# Patient Record
Sex: FEMALE | Race: WHITE | URBAN - METROPOLITAN AREA
[De-identification: names, ages, dates, MRNs, and addresses within clinical notes are randomized per-mention and may not be internally consistent; named-entity substitution may affect disease eponyms.]

---

## 2022-01-26 ENCOUNTER — OFFICE VISIT (OUTPATIENT)
Dept: URBAN - METROPOLITAN AREA CLINIC 30 | Facility: CLINIC | Age: 52
End: 2022-01-26
Payer: COMMERCIAL

## 2022-01-26 DIAGNOSIS — H16.223 KERATOCONJUNCTIVITIS SICCA, BILATERAL: Primary | ICD-10-CM

## 2022-01-26 DIAGNOSIS — H00.021 HORDEOLUM INTERNUM RIGHT UPPER LID: ICD-10-CM

## 2022-01-26 PROCEDURE — 0330T TEAR FILM IMG UNI/BI W/I&R: CPT | Performed by: OPTOMETRIST

## 2022-01-26 PROCEDURE — 68761 CLOSE TEAR DUCT OPENING: CPT | Performed by: OPTOMETRIST

## 2022-01-26 RX ORDER — CYCLOSPORINE 0 G/ML
0.09 % SOLUTION/ DROPS OPHTHALMIC; TOPICAL
Qty: 180 | Refills: 3 | Status: ACTIVE
Start: 2022-01-26

## 2022-01-26 RX ORDER — CYCLOSPORINE 0.5 MG/ML
0.05 % EMULSION OPHTHALMIC
Qty: 180 | Refills: 6 | Status: ACTIVE
Start: 2022-01-26

## 2022-01-26 NOTE — IMPRESSION/PLAN
Impression: Keratoconjunctivitis sicca, bilateral: N84.585.
+sjrogrens +dry mouth +fatigue +RA suspect +Asthma
01/2022 [place] 10, 5 Mild [place] Plan: Pt  notes redness, headaches, crust OU. +allergies. +computers daily. Usng Claritin. Cont AT's qid OU. Recommend O3's. Avoid ceiling fans. 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 +blinking. Recently diagnosed w sjrogrens x 2 weeks ago by PCP. Pt see's rheumatologist  x 3 weeks. Start Cequa BID OU. 

01/2022 BLL 0.6mm Ultra Plugs DEC today 01/2022. Pt ed of condition. Discussed in detail ADDE/JOHNNY. 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 Discussed options for treatment such as IPL and Ilux Combo in order to maintain MG function. Pt aware of out of pocket cost $350.

## 2022-02-28 ENCOUNTER — OFFICE VISIT (OUTPATIENT)
Dept: URBAN - METROPOLITAN AREA CLINIC 30 | Facility: CLINIC | Age: 52
End: 2022-02-28
Payer: COMMERCIAL

## 2022-02-28 DIAGNOSIS — H43.393 OTHER VITREOUS OPACITIES, BILATERAL: ICD-10-CM

## 2022-02-28 PROCEDURE — 92134 CPTRZ OPH DX IMG PST SGM RTA: CPT | Performed by: OPTOMETRIST

## 2022-02-28 PROCEDURE — 92014 COMPRE OPH EXAM EST PT 1/>: CPT | Performed by: OPTOMETRIST

## 2022-02-28 PROCEDURE — 68761 CLOSE TEAR DUCT OPENING: CPT | Performed by: OPTOMETRIST

## 2022-02-28 RX ORDER — PREDNISOLONE ACETATE 10 MG/ML
1 % SUSPENSION/ DROPS OPHTHALMIC
Qty: 5 | Refills: 0 | Status: ACTIVE
Start: 2022-02-28

## 2022-02-28 ASSESSMENT — INTRAOCULAR PRESSURE
OD: 15
OS: 15

## 2022-02-28 ASSESSMENT — VISUAL ACUITY
OD: 20/30
OS: 20/30

## 2022-02-28 ASSESSMENT — KERATOMETRY
OS: 44.17
OD: 44.21

## 2022-02-28 NOTE — IMPRESSION/PLAN
Impression: Other vitreous opacities, bilateral: H43.393. Plan: Retina exam appears stable. Signs and symptoms of RD reviewed. RTC STAT if any increased in floaters or loss of VA.   Mac OCT today- stable

## 2022-02-28 NOTE — IMPRESSION/PLAN
Impression: Keratoconjunctivitis sicca, bilateral: I87.553.
+sjrogrens +dry mouth +fatigue +RA suspect +Asthma
01/2022 [address] 10, 5 Mild [place] Plan: Pt  notes redness, headaches, crust OU- some improvement. PEK persist OS.  +allergies. +computers daily. Usng Claritin. Cont AT's qid OU. Recommend O3's. Avoid ceiling fans. 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 +blinking. Recently diagnosed w sjogrens  by PCP. Pt see's rheumatologist. Cont Cequa BID OU. Start PA 1% BID OU. Consider AS. Discussed Regen. Generated new SRX today. 01/2022 BLL 0.6mm Ultra Plugs 02/2022 WHITNEY 0.4mm Ultra Plug DEC 01/2022. Pt ed of condition. Discussed in detail ADDE/JOHNNY. 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 Discussed options for treatment such as IPL and Ilux Combo in order to maintain MG function. Pt aware of out of pocket cost $350.

## 2022-03-07 ENCOUNTER — OFFICE VISIT (OUTPATIENT)
Dept: URBAN - METROPOLITAN AREA CLINIC 30 | Facility: CLINIC | Age: 52
End: 2022-03-07
Payer: COMMERCIAL

## 2022-03-07 PROCEDURE — 68761 CLOSE TEAR DUCT OPENING: CPT | Performed by: OPTOMETRIST

## 2022-03-07 ASSESSMENT — INTRAOCULAR PRESSURE
OS: 16
OD: 16

## 2022-03-07 NOTE — IMPRESSION/PLAN
Impression: Keratoconjunctivitis sicca, bilateral: Y12.576.
+sjrogrens +dry mouth +fatigue +RA suspect +Asthma
01/2022 [address] 10, 5 Mild Doreene Richters Recently diagnosed w Sjogrens Yaneth Leaver Using Claritin. Plan: PEK has improved on exam. Pt notes discomfort/tearing/mucous OS x 1 day. Using Cequa samples BID OU and PA 1% BID OU. Pt started O3. - not sure if triglyceride form- discussed importance of for best absorbability. Avoid ceiling fans. 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 +blinking. Pt see's rheumatologist. 
Cont Cequa BID OU. Cont PA 1% BID OU. Consider AS. Previously discussed Regen. 01/2022 BLL 0.6mm Ultra Plugs 02/2022 WHITNEY 0.4mm Ultra Plug- removed 3/7/2022 due to irritation/tearing 03/2022 WHITNEY 0.5mm EaglePlug DEC 01/2022. Pt ed of condition. Discussed in detail ADDE/JOHNNY. 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 Discussed options for treatment such as IPL and Ilux Combo in order to maintain MG function. Pt aware of out of pocket cost $350.